# Patient Record
Sex: FEMALE | Race: WHITE | NOT HISPANIC OR LATINO | URBAN - METROPOLITAN AREA
[De-identification: names, ages, dates, MRNs, and addresses within clinical notes are randomized per-mention and may not be internally consistent; named-entity substitution may affect disease eponyms.]

---

## 2018-04-19 ENCOUNTER — OFFICE VISIT CONVERTED (OUTPATIENT)
Dept: GASTROENTEROLOGY | Facility: CLINIC | Age: 58
End: 2018-04-19
Attending: INTERNAL MEDICINE

## 2018-06-06 ENCOUNTER — OFFICE VISIT CONVERTED (OUTPATIENT)
Dept: OTHER | Facility: HOSPITAL | Age: 58
End: 2018-06-06
Attending: NURSE PRACTITIONER

## 2018-06-06 ENCOUNTER — CONVERSION ENCOUNTER (OUTPATIENT)
Dept: OTHER | Facility: HOSPITAL | Age: 58
End: 2018-06-06

## 2018-08-06 ENCOUNTER — OFFICE VISIT CONVERTED (OUTPATIENT)
Dept: OTHER | Facility: HOSPITAL | Age: 58
End: 2018-08-06
Attending: NURSE PRACTITIONER

## 2018-11-02 ENCOUNTER — OFFICE VISIT CONVERTED (OUTPATIENT)
Dept: OTHER | Facility: HOSPITAL | Age: 58
End: 2018-11-02
Attending: NURSE PRACTITIONER

## 2019-01-30 ENCOUNTER — OFFICE VISIT CONVERTED (OUTPATIENT)
Dept: OTHER | Facility: HOSPITAL | Age: 59
End: 2019-01-30
Attending: NURSE PRACTITIONER

## 2019-04-29 ENCOUNTER — OFFICE VISIT CONVERTED (OUTPATIENT)
Dept: OTHER | Facility: HOSPITAL | Age: 59
End: 2019-04-29
Attending: NURSE PRACTITIONER

## 2019-04-29 ENCOUNTER — CONVERSION ENCOUNTER (OUTPATIENT)
Dept: OTHER | Facility: HOSPITAL | Age: 59
End: 2019-04-29

## 2019-07-26 ENCOUNTER — OFFICE VISIT CONVERTED (OUTPATIENT)
Dept: OTHER | Facility: HOSPITAL | Age: 59
End: 2019-07-26
Attending: NURSE PRACTITIONER

## 2019-07-26 ENCOUNTER — CONVERSION ENCOUNTER (OUTPATIENT)
Dept: OTHER | Facility: HOSPITAL | Age: 59
End: 2019-07-26

## 2019-10-17 ENCOUNTER — OFFICE VISIT CONVERTED (OUTPATIENT)
Dept: OTHER | Facility: HOSPITAL | Age: 59
End: 2019-10-17
Attending: NURSE PRACTITIONER

## 2019-10-17 ENCOUNTER — CONVERSION ENCOUNTER (OUTPATIENT)
Dept: OTHER | Facility: HOSPITAL | Age: 59
End: 2019-10-17

## 2021-05-15 VITALS
RESPIRATION RATE: 18 BRPM | WEIGHT: 109 LBS | HEART RATE: 71 BPM | BODY MASS INDEX: 20.06 KG/M2 | DIASTOLIC BLOOD PRESSURE: 52 MMHG | OXYGEN SATURATION: 99 % | HEIGHT: 62 IN | TEMPERATURE: 98.3 F | SYSTOLIC BLOOD PRESSURE: 85 MMHG

## 2021-05-15 VITALS
DIASTOLIC BLOOD PRESSURE: 60 MMHG | TEMPERATURE: 97.1 F | HEIGHT: 62 IN | OXYGEN SATURATION: 96 % | BODY MASS INDEX: 18.86 KG/M2 | HEART RATE: 95 BPM | SYSTOLIC BLOOD PRESSURE: 118 MMHG | WEIGHT: 102.5 LBS | RESPIRATION RATE: 18 BRPM

## 2021-05-15 VITALS
HEIGHT: 62 IN | TEMPERATURE: 98.2 F | SYSTOLIC BLOOD PRESSURE: 110 MMHG | DIASTOLIC BLOOD PRESSURE: 60 MMHG | HEART RATE: 70 BPM | BODY MASS INDEX: 18.22 KG/M2 | RESPIRATION RATE: 18 BRPM | OXYGEN SATURATION: 98 % | WEIGHT: 99 LBS

## 2021-05-15 VITALS — SYSTOLIC BLOOD PRESSURE: 100 MMHG | DIASTOLIC BLOOD PRESSURE: 60 MMHG

## 2021-05-16 VITALS
RESPIRATION RATE: 18 BRPM | OXYGEN SATURATION: 100 % | HEIGHT: 62 IN | TEMPERATURE: 97 F | BODY MASS INDEX: 15.83 KG/M2 | HEART RATE: 85 BPM | WEIGHT: 86 LBS | DIASTOLIC BLOOD PRESSURE: 42 MMHG | SYSTOLIC BLOOD PRESSURE: 88 MMHG

## 2021-05-16 VITALS
HEIGHT: 62 IN | DIASTOLIC BLOOD PRESSURE: 52 MMHG | RESPIRATION RATE: 18 BRPM | HEART RATE: 75 BPM | TEMPERATURE: 98.2 F | BODY MASS INDEX: 16.01 KG/M2 | SYSTOLIC BLOOD PRESSURE: 118 MMHG | WEIGHT: 87 LBS | OXYGEN SATURATION: 97 %

## 2021-05-16 VITALS
HEIGHT: 62 IN | HEART RATE: 89 BPM | RESPIRATION RATE: 18 BRPM | SYSTOLIC BLOOD PRESSURE: 118 MMHG | WEIGHT: 88 LBS | BODY MASS INDEX: 16.2 KG/M2 | OXYGEN SATURATION: 99 % | TEMPERATURE: 98.6 F | DIASTOLIC BLOOD PRESSURE: 80 MMHG

## 2021-05-16 VITALS
SYSTOLIC BLOOD PRESSURE: 118 MMHG | RESPIRATION RATE: 18 BRPM | OXYGEN SATURATION: 100 % | BODY MASS INDEX: 16.75 KG/M2 | WEIGHT: 91 LBS | HEIGHT: 62 IN | DIASTOLIC BLOOD PRESSURE: 58 MMHG | TEMPERATURE: 98 F | HEART RATE: 86 BPM

## 2021-05-16 VITALS
DIASTOLIC BLOOD PRESSURE: 52 MMHG | HEIGHT: 62 IN | BODY MASS INDEX: 16.47 KG/M2 | WEIGHT: 89.5 LBS | SYSTOLIC BLOOD PRESSURE: 93 MMHG

## 2024-09-06 ENCOUNTER — PATIENT ROUNDING (BHMG ONLY) (OUTPATIENT)
Dept: VASCULAR SURGERY | Facility: HOSPITAL | Age: 64
End: 2024-09-06
Payer: MEDICARE

## 2024-09-06 ENCOUNTER — OFFICE VISIT (OUTPATIENT)
Dept: VASCULAR SURGERY | Facility: HOSPITAL | Age: 64
End: 2024-09-06
Payer: MEDICARE

## 2024-09-06 VITALS
OXYGEN SATURATION: 92 % | HEART RATE: 67 BPM | BODY MASS INDEX: 28.89 KG/M2 | HEIGHT: 62 IN | DIASTOLIC BLOOD PRESSURE: 70 MMHG | TEMPERATURE: 97.5 F | WEIGHT: 157 LBS | SYSTOLIC BLOOD PRESSURE: 142 MMHG | RESPIRATION RATE: 18 BRPM

## 2024-09-06 DIAGNOSIS — I73.9 PAD (PERIPHERAL ARTERY DISEASE): Primary | ICD-10-CM

## 2024-09-06 DIAGNOSIS — I70.213 ATHEROSCLEROSIS OF NATIVE ARTERIES OF EXTREMITIES WITH INTERMITTENT CLAUDICATION, BILATERAL LEGS: ICD-10-CM

## 2024-09-06 PROCEDURE — G0463 HOSPITAL OUTPT CLINIC VISIT: HCPCS | Performed by: SURGERY

## 2024-09-06 RX ORDER — FAMOTIDINE 40 MG/1
1 TABLET, FILM COATED ORAL DAILY
COMMUNITY

## 2024-09-06 RX ORDER — MELOXICAM 15 MG/1
1 TABLET ORAL DAILY
COMMUNITY

## 2024-09-06 RX ORDER — ASPIRIN 81 MG/1
1 TABLET ORAL DAILY
COMMUNITY
Start: 2024-07-25

## 2024-09-06 RX ORDER — OXYMETAZOLINE HYDROCHLORIDE 0.05 G/100ML
2 SPRAY NASAL 2 TIMES DAILY
COMMUNITY

## 2024-09-06 RX ORDER — FOLIC ACID 1 MG/1
1 TABLET ORAL DAILY
COMMUNITY
Start: 2024-04-20

## 2024-09-06 RX ORDER — SERTRALINE HYDROCHLORIDE 25 MG/1
1 TABLET, FILM COATED ORAL DAILY
COMMUNITY

## 2024-09-06 RX ORDER — GABAPENTIN 100 MG/1
1 CAPSULE ORAL 3 TIMES DAILY
COMMUNITY

## 2024-09-06 RX ORDER — ERGOCALCIFEROL 1.25 MG/1
1 CAPSULE, LIQUID FILLED ORAL WEEKLY
COMMUNITY
Start: 2024-07-29

## 2024-09-06 RX ORDER — DOCUSATE SODIUM 100 MG/1
100 CAPSULE, LIQUID FILLED ORAL 3 TIMES DAILY PRN
COMMUNITY

## 2024-09-06 RX ORDER — ROSUVASTATIN CALCIUM 5 MG/1
1 TABLET, COATED ORAL DAILY
COMMUNITY

## 2024-10-28 ENCOUNTER — HOSPITAL ENCOUNTER (OUTPATIENT)
Dept: CARDIOLOGY | Facility: HOSPITAL | Age: 64
Discharge: HOME | End: 2024-10-28
Payer: MEDICARE

## 2024-10-28 ENCOUNTER — OFFICE VISIT (OUTPATIENT)
Dept: VASCULAR SURGERY | Facility: HOSPITAL | Age: 64
End: 2024-10-28
Payer: MEDICARE

## 2024-10-28 VITALS
OXYGEN SATURATION: 97 % | TEMPERATURE: 98.2 F | RESPIRATION RATE: 18 BRPM | HEART RATE: 86 BPM | DIASTOLIC BLOOD PRESSURE: 72 MMHG | SYSTOLIC BLOOD PRESSURE: 160 MMHG

## 2024-10-28 DIAGNOSIS — I70.213 ATHEROSCLEROSIS OF NATIVE ARTERIES OF EXTREMITIES WITH INTERMITTENT CLAUDICATION, BILATERAL LEGS: ICD-10-CM

## 2024-10-28 DIAGNOSIS — I73.9 PAD (PERIPHERAL ARTERY DISEASE): Primary | ICD-10-CM

## 2024-10-28 DIAGNOSIS — I73.9 PAD (PERIPHERAL ARTERY DISEASE): ICD-10-CM

## 2024-10-28 DIAGNOSIS — I71.43 INFRARENAL ABDOMINAL AORTIC ANEURYSM (AAA) WITHOUT RUPTURE: ICD-10-CM

## 2024-10-28 LAB
ABDOMINAL LT COM ILIAC AP: 0.6 CM
ABDOMINAL LT COM ILIAC TRANS: 0.7 CM
ABDOMINAL MID AORTA AP: 1.1 CM
ABDOMINAL MID AORTA TRANS: 1.2 CM
ABDOMINAL PROX AORTA AP: 1 CM
ABDOMINAL PROX AORTA TRANS: 1 CM
ABDOMINAL RT COM ILIAC AP: 0.5 CM
ABDOMINAL RT COM ILIAC TRANS: 0.9 CM
BH CV LOWER ARTERIAL LEFT ABI RATIO: 1.12
BH CV LOWER ARTERIAL LEFT DORSALIS PEDIS SYS MAX: 151
BH CV LOWER ARTERIAL LEFT GREAT TOE SYS MAX: 103
BH CV LOWER ARTERIAL LEFT LOW THIGH SYS MAX: 147
BH CV LOWER ARTERIAL LEFT POPLITEAL SYS MAX: 138
BH CV LOWER ARTERIAL LEFT POST TIBIAL SYS MAX: 161
BH CV LOWER ARTERIAL LEFT TBI RATIO: 0.72
BH CV LOWER ARTERIAL RIGHT ABI RATIO: 1.01
BH CV LOWER ARTERIAL RIGHT DORSALIS PEDIS SYS MAX: 141
BH CV LOWER ARTERIAL RIGHT GREAT TOE SYS MAX: 108
BH CV LOWER ARTERIAL RIGHT LOW THIGH SYS MAX: 156
BH CV LOWER ARTERIAL RIGHT POPLITEAL SYS MAX: 153
BH CV LOWER ARTERIAL RIGHT POST TIBIAL SYS MAX: 146
BH CV LOWER ARTERIAL RIGHT TBI RATIO: 0.75
UPPER ARTERIAL LEFT ARM BRACHIAL SYS MAX: 131
UPPER ARTERIAL RIGHT ARM BRACHIAL SYS MAX: 144

## 2024-10-28 PROCEDURE — 93923 UPR/LXTR ART STDY 3+ LVLS: CPT | Performed by: SURGERY

## 2024-10-28 PROCEDURE — 93978 VASCULAR STUDY: CPT | Performed by: SURGERY

## 2024-10-28 PROCEDURE — 1159F MED LIST DOCD IN RCRD: CPT | Performed by: SURGERY

## 2024-10-28 PROCEDURE — 93923 UPR/LXTR ART STDY 3+ LVLS: CPT

## 2024-10-28 PROCEDURE — 93978 VASCULAR STUDY: CPT

## 2024-10-28 PROCEDURE — 1160F RVW MEDS BY RX/DR IN RCRD: CPT | Performed by: SURGERY

## 2024-10-28 PROCEDURE — 99212 OFFICE O/P EST SF 10 MIN: CPT | Performed by: SURGERY

## 2024-10-28 NOTE — PROGRESS NOTES
[facility]   Follow up Office    Patient Name: Farnaz Dash  : 1960  MRN: [id number]  Primary Care Physician:  Yael Larry APRN      Subjective   Subjective     HPI:    Farnaz Dash is a 64 y.o. female here for follow-up for vascular studies.  There was some concern as to family history of aneurysmal disease as well as peripheral artery disease.  No new complaints at this time.      Objective     Vitals:   Temp:  [98.2 °F (36.8 °C)] 98.2 °F (36.8 °C)  Heart Rate:  [86] 86  Resp:  [18] 18  BP: (160)/(72) 160/72    Physical Exam      General: Alert, no acute distress.  HEENT: PERRLA  Abdomen: Benign  Extremities: Symmetric.  Mild edema and induration with some erythematous changes.  Neuro: No gross deficits    Diagnostic studies: An arterial Doppler and an aortic duplex both done today, 10/28/2024, demonstrate no significant peripheral vascular disease with normal ABIs at 1.01 on the right, 1.12 on the left with satisfactory waveforms.  Normal-sized aorta with a maximum diameter 1.2 cm.    Assessment & Plan   Assessment / Plan     Diagnoses and all orders for this visit:    1. PAD (peripheral artery disease) (Primary)       Assessment/Plan:   No clinically significant arterial insufficiency.  No aneurysmal dilatation.  Recommend compression stockings for control of the slight edema and discomfort.  Follow-up with us as needed.        Electronically signed by Enrique Mcmanus MD, 10/28/24, 9:54 AM EDT.

## 2024-11-01 ENCOUNTER — TELEPHONE (OUTPATIENT)
Dept: VASCULAR SURGERY | Facility: HOSPITAL | Age: 64
End: 2024-11-01
Payer: MEDICARE

## 2024-11-01 NOTE — TELEPHONE ENCOUNTER
"    Caller: Farnaz Dash \"Eliz\"    Relationship: Self    Best call back number:     [phone]       What form or medical record are you requesting: RETURN TO PHYSICAL THERAPY     Who is requesting this form or medical record from you: [facility]     How would you like to receive the form or medical records (pick-up, mail, fax):     [facility] - [phone]    Timeframe paperwork needed: ASAP     Additional notes: PATIENT FORGOT PT LETTER AT HER LAST APPOINTMENT. [facility] THAT [facility] CAN[facility]T START [facility] AGAIN.           "